# Patient Record
(demographics unavailable — no encounter records)

---

## 2024-10-09 NOTE — ASSESSMENT
[FreeTextEntry1] :  right shoulder and pain into neck during working on 10/3/24.  She works at [employer].  Got progressively worse during day. 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  Right shoulder pain with likely cuff tendonitis versus partial tear.  Mild neck pain.  History of c spine fusion c56.

## 2024-10-09 NOTE — ASSESSMENT
[FreeTextEntry1] :  right shoulder and pain into neck during working on 10/3/24.  She works at [employer].  Got progressively worse during day. 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  Right shoulder pain with likely cuff tendonitis versus partial tear.  Mild neck pain.  History of c spine fusion c56.

## 2024-10-09 NOTE — HISTORY OF PRESENT ILLNESS
[Work related] : work related [Gradual] : gradual [Frequent] : frequent [Sleep] : sleep [de-identified] : 10/9/24: right shoulder and pain into neck during working on 10/3/24. [FreeTextEntry1] : right shoulder  [] : no [FreeTextEntry3] : 10/3/24 [FreeTextEntry4] : over a year ago, recently in pain [FreeTextEntry5] : New patient here for  RT shoulder pain. Patient is RT hand dominant. Patient was working on a unit at work a year ago and overtime after using the screwdriver her shoulder is in pain. Patient is having a sharp right pain from the shoulder down to the bicep and up into the neck. Patient is feeling very sore at times and it will disrupt her sleep. Feels that her daily life is being impacted by this whether it is driving or sleeping for example. Patient is an  for [employer] and constantly using a screwdriver. 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

## 2024-10-09 NOTE — IMAGING
[No loosening of hardware] : No loosening of hardware [Right] : right shoulder [There are no fractures, subluxations or dislocations. No significant abnormalities are seen] : There are no fractures, subluxations or dislocations. No significant abnormalities are seen

## 2024-10-09 NOTE — PHYSICAL EXAM
[Right] : right shoulder [4 ___] : forward flexion 4[unfilled]/5 [4___] : abduction 4[unfilled]/5 [5___] : internal rotation 5[unfilled]/5 [] : positive impingement testing [TWNoteComboBox7] : active forward flexion 160 degrees [TWNoteComboBox6] : internal rotation L4 [de-identified] : external rotation 20 degrees

## 2024-10-09 NOTE — HISTORY OF PRESENT ILLNESS
[Work related] : work related [Gradual] : gradual [Frequent] : frequent [Sleep] : sleep [de-identified] : 10/9/24: right shoulder and pain into neck during working on 10/3/24. [] : no [FreeTextEntry1] : right shoulder  [FreeTextEntry3] : 10/3/24 [FreeTextEntry4] : over a year ago, recently in pain [FreeTextEntry5] : New patient here for  RT shoulder pain. Patient is RT hand dominant. Patient was working on a unit at work a year ago and overtime after using the screwdriver her shoulder is in pain. Patient is having a sharp right pain from the shoulder down to the bicep and up into the neck. Patient is feeling very sore at times and it will disrupt her sleep. Feels that her daily life is being impacted by this whether it is driving or sleeping for example. Patient is an  for [email or web address] and constantly using a screwdriver. 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

## 2024-10-09 NOTE — PHYSICAL EXAM
[Right] : right shoulder [4 ___] : forward flexion 4[unfilled]/5 [4___] : abduction 4[unfilled]/5 [5___] : internal rotation 5[unfilled]/5 [] : positive impingement testing [TWNoteComboBox7] : active forward flexion 160 degrees [TWNoteComboBox6] : internal rotation L4 [de-identified] : external rotation 20 degrees

## 2024-10-09 NOTE — DISCUSSION/SUMMARY
[de-identified] : HEP and PT.  No work.  Ibuprofen 600. The patient's orthopaedic condition(s) warrants intermittent use of a prescription strength non-steroidal anti-inflammatory medication.  These medications are associated with risks including but not limited to gastrointestinal irritation, kidney damage, hypertension, and bleeding.  The patient understands and will take medications as prescribed.  The patient will stop the medication and consult a physician as needed if problems arise.  MRI right shoulder to assess.  Follow up after MRI.

## 2024-10-09 NOTE — DISCUSSION/SUMMARY
[de-identified] : HEP and PT.  No work.  Ibuprofen 600. The patient's orthopaedic condition(s) warrants intermittent use of a prescription strength non-steroidal anti-inflammatory medication.  These medications are associated with risks including but not limited to gastrointestinal irritation, kidney damage, hypertension, and bleeding.  The patient understands and will take medications as prescribed.  The patient will stop the medication and consult a physician as needed if problems arise.  MRI right shoulder to assess.  Follow up after MRI.

## 2024-10-09 NOTE — WORK
[Sprain/Strain] : sprain/strain [Was the competent medical cause of the injury] : was the competent medical cause of the injury [Are consistent with the injury] : are consistent with the injury [Consistent with my objective findings] : consistent with my objective findings [Total (100%)] : total (100%) [Does not reveal pre-existing condition(s) that may affect treatment/prognosis] : does not reveal pre-existing condition(s) that may affect treatment/prognosis [Cannot return to work because ________] : cannot return to work because [unfilled] [Climbing stairs/Ladders] : climbing stairs/ladders [Lifting] : lifting [Pulling/Pushing] : pulling/pushing [Simple grasping] : simple grasping [Reaching overhead] : reaching overhead [Use of upper extremities] : use of upper extremities [Reaching at/below shoulder level] : reaching at or below shoulder level [Unknown at this time] : : unknown at this time [Patient] : patient [No Rx restrictions] : No Rx restrictions. [I provided the services listed above] :  I provided the services listed above.

## 2024-10-09 NOTE — WORK
[Sprain/Strain] : sprain/strain [Was the competent medical cause of the injury] : was the competent medical cause of the injury [Are consistent with the injury] : are consistent with the injury [Consistent with my objective findings] : consistent with my objective findings [Total (100%)] : total (100%) [Does not reveal pre-existing condition(s) that may affect treatment/prognosis] : does not reveal pre-existing condition(s) that may affect treatment/prognosis [Cannot return to work because ________] : cannot return to work because [unfilled] [Climbing stairs/Ladders] : climbing stairs/ladders [Lifting] : lifting [Pulling/Pushing] : pulling/pushing [Simple grasping] : simple grasping [Reaching overhead] : reaching overhead [Reaching at/below shoulder level] : reaching at or below shoulder level [Use of upper extremities] : use of upper extremities [Unknown at this time] : : unknown at this time [Patient] : patient [No Rx restrictions] : No Rx restrictions. [I provided the services listed above] :  I provided the services listed above.

## 2024-10-16 NOTE — PHYSICAL EXAM
[Right] : right shoulder [4 ___] : forward flexion 4[unfilled]/5 [4___] : abduction 4[unfilled]/5 [5___] : internal rotation 5[unfilled]/5 [] : positive impingement testing [TWNoteComboBox7] : active forward flexion 160 degrees [TWNoteComboBox6] : internal rotation L4 [de-identified] : external rotation 20 degrees

## 2024-10-16 NOTE — ASSESSMENT
[FreeTextEntry1] : right shoulder and pain into neck during working on 10/3/24.  She works at [employer].  Got progressively worse during day. 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  mri 2024 shows cuff tendinosis, biceps inflammation, ptrct and acromial impingment.  Mild neck pain.  History of c spine fusion C5-6.  ***heart palpitations, flushing and facial swelling with steroids***

## 2024-10-16 NOTE — DATA REVIEWED
[MRI] : MRI [Right] : of the right [Shoulder] : shoulder [I independently reviewed and interpreted images and report] : I independently reviewed and interpreted images and report [FreeTextEntry1] : ([facility] 101/10/24) Impression:  Tendinosis of supraspinatus tendon.  Tear in the footprint of subscapularis tendon with background changes of tendinosis.  Tenosynovitis along the extra-articular biceps and small subcoracoid bursitis.

## 2024-10-16 NOTE — DISCUSSION/SUMMARY
[de-identified] : HEP and PT.  No work.  Ibuprofen 600. The patient's orthopaedic condition(s) warrants intermittent use of a prescription strength non-steroidal anti-inflammatory medication.  These medications are associated with risks including but not limited to gastrointestinal irritation, kidney damage, hypertension, and bleeding.  The patient understands and will take medications as prescribed.  The patient will stop the medication and consult a physician as needed if problems arise.  MRI right shoulder findings and images reviewed.  Follow up 4 weeks.

## 2024-10-16 NOTE — HISTORY OF PRESENT ILLNESS
[Work related] : work related [Gradual] : gradual [5] : 5 [2] : 2 [Frequent] : frequent [Sleep] : sleep [de-identified] : 10/9/24: right shoulder and pain into neck during working on 10/3/24. 10/16/24: f/u for MRI review right shoulder.  She is OOW.  Rest from work has helped pain. [] : no [FreeTextEntry1] : right shoulder  [FreeTextEntry3] : 10/3/24 [FreeTextEntry4] : over a year ago, recently in pain [FreeTextEntry5] : Follow up for MRI results for RT shoulder, symptoms are same as last visit.  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

## 2024-11-13 NOTE — ASSESSMENT
[FreeTextEntry1] : right shoulder and pain into neck during working on 10/3/24.  She works at [employer].  Got progressively worse during day. 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  mri 2024 shows cuff tendinosis, biceps inflammation, ptrct and acromial impingement.  Mild neck pain.  History of c spine fusion C5-6.  Now tingling in hand and weakness in the arm.  Possibly from c spine versus CTS.    *heart palpitations, flushing and facial swelling with steroids

## 2024-11-13 NOTE — PHYSICAL EXAM
[Right] : right shoulder [4 ___] : forward flexion 4[unfilled]/5 [4___] : abduction 4[unfilled]/5 [5___] : internal rotation 5[unfilled]/5 [] : positive impingement testing [TWNoteComboBox7] : active forward flexion 160 degrees [TWNoteComboBox6] : internal rotation L4 [de-identified] : external rotation 20 degrees

## 2024-11-13 NOTE — HISTORY OF PRESENT ILLNESS
[Work related] : work related [Gradual] : gradual [2] : 2 [Frequent] : frequent [Sleep] : sleep [10] : 10 [Ice] : ice [de-identified] : 10/9/24: right shoulder and pain into neck during working on 10/3/24. 10/16/24: f/u for MRI review right shoulder.  She is OOW.  Rest from work has helped pain. 11/13/24: cont R shoulder pain. now having some tingling right arm. [] : no [FreeTextEntry1] : right shoulder  [FreeTextEntry3] : 10/3/24 [FreeTextEntry4] : over a year ago, recently in pain [FreeTextEntry5] : WC follow up for RT shoulder. Patient is RHD. Patient states she is doing PT 2x a week and that it has not been helping, she feels that it is making the pain worse. Patient states pain is localized. No changes of medication since last visit.  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

## 2024-11-13 NOTE — DISCUSSION/SUMMARY
[de-identified] : EMG right arm to assess.  Ibuprofen 800. The patient's orthopaedic condition(s) warrants intermittent use of a prescription strength non-steroidal anti-inflammatory medication.  These medications are associated with risks including but not limited to gastrointestinal irritation, kidney damage, hypertension, and bleeding.  The patient understands and will take medications as prescribed.  The patient will stop the medication and consult a physician as needed if problems arise.  HEP and PT.  Follow up after EMG.

## 2024-12-04 NOTE — PHYSICAL EXAM
[4 ___] : forward flexion 4[unfilled]/5 [5___] : internal rotation 5[unfilled]/5 [Right] : right elbow [NL (0)] : extension 0 degrees [4___] : supination 4[unfilled]/5 [] : light touch intact [TWNoteComboBox7] : flexion 120 degrees [TWNoteComboBox6] : pronation 80 degrees [de-identified] : supination 80 degrees

## 2024-12-04 NOTE — ASSESSMENT
[FreeTextEntry1] : right shoulder and pain into neck during working on 10/3/24.  She works at [employer].  Got progressively worse during day. 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  mri 2024 shows cuff tendinosis, biceps inflammation, ptrct and acromial impingement.  still has some pain with motion.  Mild neck pain.  History of c spine fusion C5-6.  Now tingling in hand and weakness in the arm.  Possibly from c spine versus CTS.  emg shows some nerve compression. rec seeing spine doc but patient defers.  Also cubital tunnel.    defers seeing elbow doctor for possible nerve transposition  *heart palpitations, flushing and facial swelling with steroids

## 2024-12-04 NOTE — HISTORY OF PRESENT ILLNESS
[Work related] : work related [Gradual] : gradual [10] : 10 [2] : 2 [Frequent] : frequent [Sleep] : sleep [Ice] : ice [de-identified] : 10/9/24: right shoulder and pain into neck during working on 10/3/24. 10/16/24: f/u for MRI review right shoulder.  She is OOW.  Rest from work has helped pain. 11/13/24: cont R shoulder pain. now having some tingling right arm. 12/4/24:  still has pain up and down arm.  some pain with shoulder motion. [] : no [FreeTextEntry1] : right shoulder  [FreeTextEntry3] : 10/3/24 [FreeTextEntry4] : over a year ago, recently in pain [FreeTextEntry5] : WC follow up for RT shoulder. Patient is RHD. Patient states she is doing PT 2x a week and that it has not been helping, she feels that it is making the pain worse. Patient states pain is localized. No changes of medication since last visit.  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

## 2024-12-04 NOTE — DISCUSSION/SUMMARY
[de-identified] : The patient's orthopaedic condition(s) warrants consideration of consistent or intermittent use of a prescription strength non-steroidal anti-inflammatory medication (IBUPROFEN 600MG).  This medication is associated with risks including but not limited to gastrointestinal irritation, kidney damage, hypertension, and bleeding. The patient notes they already have a valid prescription for the medication. The patient understands the risks and will take medication as prescribed previously.  The patient will stop the medication and consult a physician as needed if problems arise.  The patient should not work at this time.  The patient should perform a home exercise program as directed.  Follow up 4 weeks

## 2025-01-08 NOTE — HISTORY OF PRESENT ILLNESS
[Work related] : work related [Gradual] : gradual [10] : 10 [2] : 2 [Frequent] : frequent [Sleep] : sleep [Ice] : ice [de-identified] : 10/9/24: right shoulder and pain into neck during working on 10/3/24. 10/16/24: f/u for MRI review right shoulder.  She is OOW.  Rest from work has helped pain. 11/13/24: cont R shoulder pain. now having some tingling right arm. 12/4/24:  still has pain up and down arm.  some pain with shoulder motion. 1/8/25: right shoulder pain persists.   [] : no [FreeTextEntry1] : right shoulder  [FreeTextEntry3] : 10/3/24 [FreeTextEntry4] : over a year ago, recently in pain [FreeTextEntry5] : WC follow up for RT shoulder. Patient is not doing PT anymore. Patient states pain is a little better but she is not improving.  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

## 2025-01-08 NOTE — DISCUSSION/SUMMARY
[de-identified] : The patient's history, physical examination and imaging studies are consistent with a tearing of the rotator cuff.  The patient has failed non operative treatment and is a candidate for surgical treatment. The risks, benefits, alternatives, and likely post operative course were discussed in detail with the patient, including but not limited to infection, neurovascular injury, chondral damage or chondrolysis, possible failure of surgery and need for further treatment, medical and anesthetic complications, loss of limb and loss of life.  We also discussed the possibility of continued pain and recurrent tearing of the rotator cuff, post operative weakness, post operative loss of motion, and other problems which may require further treatment including physical therapy, medications, injections and possible future surgeries.  The patient understands and wishes to proceed with surgery.  Will proceed with right shoulder scope, cuff therese, possible repair, bursectomy, sad.  Possible biceps tenotomy.  Possible ac joint resection.  Discussed she may still have pain from nerve compression from neck and elbow.  The patient's orthopaedic condition(s) warrants intermittent use of a prescription strength non-steroidal anti-inflammatory medication (IBUPROFEN 800MG).  This medication is associated with risks including but not limited to gastrointestinal irritation, kidney damage, hypertension, and bleeding.  The patient understands and will take medication as prescribed.  The patient will stop the medication and consult a physician as needed if problems arise.  Follow up post op.

## 2025-01-08 NOTE — PHYSICAL EXAM
[4 ___] : forward flexion 4[unfilled]/5 [5___] : internal rotation 5[unfilled]/5 [Right] : right elbow [NL (0)] : extension 0 degrees [4___] : supination 4[unfilled]/5 [] : positive Speed's [TWNoteComboBox7] : flexion 120 degrees [TWNoteComboBox6] : pronation 80 degrees [de-identified] : supination 80 degrees

## 2025-01-08 NOTE — ASSESSMENT
[FreeTextEntry1] : right shoulder and pain into neck during working on 10/3/24.  She works at [employer].  Got progressively worse during day. 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  mri 2024 shows cuff tendinosis, biceps inflammation, ptrct and acromial impingement.  Mosty anterior and lateral shoulder pain with motion.  Mostly shoulder pain today.  Neck pain.  History of c spine fusion C5-6.   Emg shows some nerve compression. Most of pain in shoulder today. Patient aware tingling in arm can be from neck or elbow  Also cubital tunnel.    Defers seeing elbow doctor for possible nerve transposition at this time as shoulder pain the worse.  *heart palpitations, flushing and facial swelling with steroids

## 2025-03-27 NOTE — DISCUSSION/SUMMARY
[de-identified] : The patient's history, physical examination and imaging studies are consistent with a tearing of the rotator cuff.  The patient has failed non operative treatment and is a candidate for surgical treatment. The risks, benefits, alternatives, and likely post operative course were discussed in detail with the patient, including but not limited to infection, neurovascular injury, chondral damage or chondrolysis, possible failure of surgery and need for further treatment, medical and anesthetic complications, loss of limb and loss of life.  We also discussed the possibility of continued pain and recurrent tearing of the rotator cuff, post operative weakness, post operative loss of motion, and other problems which may require further treatment including physical therapy, medications, injections and possible future surgeries.  The patient understands and wishes to proceed with surgery.  Will proceed with right shoulder scope, cuff therese, possible repair, bursectomy, sad.  Possible biceps tenotomy.  Possible ac joint resection.  Discussed she may still have pain from nerve compression from neck and elbow.  The patient's orthopaedic condition(s) warrants intermittent use of a prescription strength non-steroidal anti-inflammatory medication (IBUPROFEN 800MG).  This medication is associated with risks including but not limited to gastrointestinal irritation, kidney damage, hypertension, and bleeding.  The patient understands and will take medication as prescribed.  The patient will stop the medication and consult a physician as needed if problems arise.  The patient has one or more conditions that would benefit from physical therapy.  The physical therapy is requested to improve any deficit in pain, range of motion, strength and other problems in the affected body part(s) as noted in the physical examination above.  A prescription for physical therapy 2 - 3 times per week for 6 weeks was prescribed for the following body parts. right shoulder   Follow up post op.

## 2025-03-27 NOTE — PHYSICAL EXAM
[Right] : right shoulder [4 ___] : forward flexion 4[unfilled]/5 [4___] : abduction 4[unfilled]/5 [5___] : internal rotation 5[unfilled]/5 [] : motor and sensory intact distally [TWNoteComboBox7] : active forward flexion 165 degrees [TWNoteComboBox6] : internal rotation L4 [de-identified] : external rotation 20 degrees

## 2025-03-27 NOTE — PHYSICAL EXAM
[Right] : right shoulder [4 ___] : forward flexion 4[unfilled]/5 [4___] : abduction 4[unfilled]/5 [5___] : internal rotation 5[unfilled]/5 [] : motor and sensory intact distally [TWNoteComboBox7] : active forward flexion 165 degrees [TWNoteComboBox6] : internal rotation L4 [de-identified] : external rotation 20 degrees

## 2025-03-27 NOTE — ASSESSMENT
[FreeTextEntry1] : Right shoulder and pain into neck during working on 10/3/24.  Got progressively worse during day. 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  Mri 2024 shows cuff tendinosis, biceps inflammation, partial rotator cuff tear, and acromial impingement.  Mosty anterior and lateral shoulder pain with motion. PT has aggravated her condition, but we will try to maintain motion while we await auth for surgery.   Neck pain.  History of c spine fusion C5-6.   Emg shows some nerve compression. Most of pain in shoulder today. Patient aware tingling in arm can be from neck or elbow  Also cubital tunnel.    Defers seeing elbow doctor for possible nerve transposition at this time as shoulder pain the worse.  *heart palpitations, flushing and facial swelling with steroids She works at [employer].

## 2025-03-27 NOTE — ASSESSMENT
[FreeTextEntry1] : Right shoulder and pain into neck during working on 10/3/24.  Got progressively worse during day. 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  Mri 2024 shows cuff tendinosis, biceps inflammation, partial rotator cuff tear, and acromial impingement.  Mosty anterior and lateral shoulder pain with motion. PT has aggravated her condition, but we will try to maintain motion while we await auth for surgery.   Neck pain.  History of c spine fusion C5-6.   Emg shows some nerve compression. Most of pain in shoulder today. Patient aware tingling in arm can be from neck or elbow  Also cubital tunnel.    Defers seeing elbow doctor for possible nerve transposition at this time as shoulder pain the worse.  *heart palpitations, flushing and facial swelling with steroids She works at [employer].

## 2025-03-27 NOTE — DISCUSSION/SUMMARY
[de-identified] : The patient's history, physical examination and imaging studies are consistent with a tearing of the rotator cuff.  The patient has failed non operative treatment and is a candidate for surgical treatment. The risks, benefits, alternatives, and likely post operative course were discussed in detail with the patient, including but not limited to infection, neurovascular injury, chondral damage or chondrolysis, possible failure of surgery and need for further treatment, medical and anesthetic complications, loss of limb and loss of life.  We also discussed the possibility of continued pain and recurrent tearing of the rotator cuff, post operative weakness, post operative loss of motion, and other problems which may require further treatment including physical therapy, medications, injections and possible future surgeries.  The patient understands and wishes to proceed with surgery.  Will proceed with right shoulder scope, cuff therese, possible repair, bursectomy, sad.  Possible biceps tenotomy.  Possible ac joint resection.  Discussed she may still have pain from nerve compression from neck and elbow.  The patient's orthopaedic condition(s) warrants intermittent use of a prescription strength non-steroidal anti-inflammatory medication (IBUPROFEN 800MG).  This medication is associated with risks including but not limited to gastrointestinal irritation, kidney damage, hypertension, and bleeding.  The patient understands and will take medication as prescribed.  The patient will stop the medication and consult a physician as needed if problems arise.  The patient has one or more conditions that would benefit from physical therapy.  The physical therapy is requested to improve any deficit in pain, range of motion, strength and other problems in the affected body part(s) as noted in the physical examination above.  A prescription for physical therapy 2 - 3 times per week for 6 weeks was prescribed for the following body parts. right shoulder   Follow up post op.

## 2025-03-27 NOTE — PHYSICAL EXAM
[Right] : right shoulder [4 ___] : forward flexion 4[unfilled]/5 [4___] : abduction 4[unfilled]/5 [5___] : internal rotation 5[unfilled]/5 [] : motor and sensory intact distally [TWNoteComboBox7] : active forward flexion 165 degrees [TWNoteComboBox6] : internal rotation L4 [de-identified] : external rotation 20 degrees

## 2025-03-27 NOTE — DISCUSSION/SUMMARY
[de-identified] : The patient's history, physical examination and imaging studies are consistent with a tearing of the rotator cuff.  The patient has failed non operative treatment and is a candidate for surgical treatment. The risks, benefits, alternatives, and likely post operative course were discussed in detail with the patient, including but not limited to infection, neurovascular injury, chondral damage or chondrolysis, possible failure of surgery and need for further treatment, medical and anesthetic complications, loss of limb and loss of life.  We also discussed the possibility of continued pain and recurrent tearing of the rotator cuff, post operative weakness, post operative loss of motion, and other problems which may require further treatment including physical therapy, medications, injections and possible future surgeries.  The patient understands and wishes to proceed with surgery.  Will proceed with right shoulder scope, cuff therese, possible repair, bursectomy, sad.  Possible biceps tenotomy.  Possible ac joint resection.  Discussed she may still have pain from nerve compression from neck and elbow.  The patient's orthopaedic condition(s) warrants intermittent use of a prescription strength non-steroidal anti-inflammatory medication (IBUPROFEN 800MG).  This medication is associated with risks including but not limited to gastrointestinal irritation, kidney damage, hypertension, and bleeding.  The patient understands and will take medication as prescribed.  The patient will stop the medication and consult a physician as needed if problems arise.  The patient has one or more conditions that would benefit from physical therapy.  The physical therapy is requested to improve any deficit in pain, range of motion, strength and other problems in the affected body part(s) as noted in the physical examination above.  A prescription for physical therapy 2 - 3 times per week for 6 weeks was prescribed for the following body parts. right shoulder   Follow up post op.

## 2025-03-27 NOTE — HISTORY OF PRESENT ILLNESS
[FreeTextEntry5] : WC follow up for RT shoulder. Patient is not doing PT anymore. Patient states pain is a little better but she is not improving.  [Work related] : work related [Gradual] : gradual [6] : 6 [2] : 2 [Burning] : burning [Dull/Aching] : dull/aching [Localized] : localized [Frequent] : frequent [Sleep] : sleep [Meds] : meds [Ice] : ice [de-identified] : 10/9/24: right shoulder and pain into neck during working on 10/3/24. 10/16/24: f/u for MRI review right shoulder.  She is OOW.  Rest from work has helped pain. 11/13/24: cont R shoulder pain. now having some tingling right arm. 12/4/24:  still has pain up and down arm.  some pain with shoulder motion. 1/8/25: right shoulder pain persists.   3/26/25: Follow up right shoulder, continued pain. 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

## 2025-03-27 NOTE — HISTORY OF PRESENT ILLNESS
[FreeTextEntry5] : WC follow up for RT shoulder. Patient is not doing PT anymore. Patient states pain is a little better but she is not improving.  [Work related] : work related [Gradual] : gradual [6] : 6 [2] : 2 [Burning] : burning [Dull/Aching] : dull/aching [Localized] : localized [Frequent] : frequent [Sleep] : sleep [Meds] : meds [Ice] : ice [de-identified] : 10/9/24: right shoulder and pain into neck during working on 10/3/24. 10/16/24: f/u for MRI review right shoulder.  She is OOW.  Rest from work has helped pain. 11/13/24: cont R shoulder pain. now having some tingling right arm. 12/4/24:  still has pain up and down arm.  some pain with shoulder motion. 1/8/25: right shoulder pain persists.   3/26/25: Follow up right shoulder, continued pain. 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

## 2025-03-27 NOTE — HISTORY OF PRESENT ILLNESS
[FreeTextEntry5] : WC follow up for RT shoulder. Patient is not doing PT anymore. Patient states pain is a little better but she is not improving.  [Work related] : work related [Gradual] : gradual [6] : 6 [2] : 2 [Burning] : burning [Dull/Aching] : dull/aching [Localized] : localized [Frequent] : frequent [Sleep] : sleep [Meds] : meds [Ice] : ice [de-identified] : 10/9/24: right shoulder and pain into neck during working on 10/3/24. 10/16/24: f/u for MRI review right shoulder.  She is OOW.  Rest from work has helped pain. 11/13/24: cont R shoulder pain. now having some tingling right arm. 12/4/24:  still has pain up and down arm.  some pain with shoulder motion. 1/8/25: right shoulder pain persists.   3/26/25: Follow up right shoulder, continued pain. 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

## 2025-03-27 NOTE — ASSESSMENT
[FreeTextEntry1] : Right shoulder and pain into neck during working on 10/3/24.  Got progressively worse during day. 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  Mri 2024 shows cuff tendinosis, biceps inflammation, partial rotator cuff tear, and acromial impingement.  Mosty anterior and lateral shoulder pain with motion. PT has aggravated her condition, but we will try to maintain motion while we await auth for surgery.   Neck pain.  History of c spine fusion C5-6.   Emg shows some nerve compression. Most of pain in shoulder today. Patient aware tingling in arm can be from neck or elbow  Also cubital tunnel.    Defers seeing elbow doctor for possible nerve transposition at this time as shoulder pain the worse.  *heart palpitations, flushing and facial swelling with steroids She works at [employer].

## 2025-04-16 NOTE — HISTORY OF PRESENT ILLNESS
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

## 2025-04-16 NOTE — HISTORY OF PRESENT ILLNESS
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

## 2025-05-28 NOTE — HISTORY OF PRESENT ILLNESS
[Right Arm] : right arm [7] : 7 [0] : 0 [Burning] : burning [Dull/Aching] : dull/aching [Meds] : meds [de-identified] : DOS 5/15/25 R shoulder arthroscopy 5/28/25: 1st PO visit. Doing well  [] : no [de-identified] : 5/15/25 [de-identified] : PT began last Friday, she reports she is sore manly after working it out otherwise doing well  [de-identified] : 5/15/25 [de-identified] : RT shoulder Arthroscopic

## 2025-05-28 NOTE — DISCUSSION/SUMMARY
[de-identified] : The patient has one or more conditions that would benefit from physical therapy.  The physical therapy is requested to improve any deficit in pain, range of motion, strength and other problems in the affected body part(s) as noted in the physical examination above.  A prescription for physical therapy 2 - 3 times per week for 6 weeks was prescribed for the following body parts. right shoulder   Can wean off sling   The patient's orthopaedic condition warrants consideration of intermittent use of over-the-counter medications such as advil, alleve, tylenol and similar agents.  The patient is aware to use the medications only as directed on the bottle and should contact a physician should they encounter any problems.  The patient has a diagnosis where controlled substance pain medication is indicated at this time (PERCOCET 5/325).  Proper use, risks, benefits, and alternatives have been discussed with the patient including but not limited to constipation, dizziness, respiratory problems, overdose, addiction, and cross reaction with other medications. The patient has a valid prescription and does not need a new one at this time. They will take the medication as prescribed.  The patient should stop taking the medication and consult a physician should problems arise  f/u 4 weeks

## 2025-05-28 NOTE — ASSESSMENT
[FreeTextEntry1] : s/p right shoulder scope, cuff therese, sad, proximal biceps tenodesis on 5/15/25  doing well

## 2025-06-25 NOTE — HISTORY OF PRESENT ILLNESS
[Right Arm] : right arm [0] : 0 [Dull/Aching] : dull/aching [Meds] : meds [8] : 8 [Sleep] : sleep [de-identified] : DOS 5/15/25 R shoulder arthroscopy 5/28/25: 1st PO visit. Doing well  6/25/25:  Follow up right shoulder. [] : no [FreeTextEntry7] : shoulder blade and shoulder down to bicep [de-identified] : one movement  [de-identified] : 5/15/25 [de-identified] : PT [de-identified] : 5/15/25 [de-identified] : RT shoulder Arthroscopic

## 2025-06-25 NOTE — PHYSICAL EXAM
[Right] : right shoulder [] : tenderness at lateral shoulder [3 ___] : forward flexion 3[unfilled]/5 [3___] : internal rotation 3[unfilled]/5 [TWNoteComboBox7] : active forward flexion 50 degrees [TWNoteComboBox4] : passive forward flexion 90 degrees [TWNoteComboBox6] : internal rotation lateral hip [de-identified] : external rotation 15 degrees

## 2025-06-25 NOTE — ASSESSMENT
[FreeTextEntry1] : s/p right shoulder scope, cuff therese, sad, proximal biceps tenodesis on 5/15/25  Improved but still some pain especially with anterioly.

## 2025-06-25 NOTE — DISCUSSION/SUMMARY
[de-identified] : The patient has one or more conditions that would benefit from physical therapy.  The physical therapy is requested to improve any deficit in pain, range of motion, strength and other problems in the affected body part(s) as noted in the physical examination above.  A prescription for physical therapy 2 - 3 times per week for 6 weeks was prescribed for the following body parts.   Right shoulder.   The patient has a diagnosis where controlled substance pain medication is indicated at this time (ULTRACET 37.5/325).  They will take the medication as prescribed.  Proper use, risks, benefits, and alternatives have been discussed with patient including but not limited to constipation, dizziness, respiratory problems, overdose, addiction, and cross reaction with other medications. The patient should stop taking medication and consult a physician should problems arise.   The patient's orthopaedic condition(s) warrants consideration of consistent or intermittent use of a prescription strength non-steroidal anti-inflammatory medication (IBUPROFEN 800MG po bid PRN).  This medication is associated with risks including but not limited to gastrointestinal irritation, kidney damage, hypertension, and bleeding. The patient notes they already have a valid prescription for the medication. The patient understands the risks and will take medication as prescribed previously.  The patient will stop the medication and consult a physician as needed if problems arise.   Follow up 4 - 6 weeks.